# Patient Record
Sex: FEMALE | Race: WHITE | NOT HISPANIC OR LATINO | URBAN - METROPOLITAN AREA
[De-identification: names, ages, dates, MRNs, and addresses within clinical notes are randomized per-mention and may not be internally consistent; named-entity substitution may affect disease eponyms.]

---

## 2017-09-23 ENCOUNTER — EMERGENCY (EMERGENCY)
Facility: HOSPITAL | Age: 77
LOS: 1 days | End: 2017-09-23
Payer: MEDICARE

## 2017-09-23 PROCEDURE — 12001 RPR S/N/AX/GEN/TRNK 2.5CM/<: CPT

## 2017-09-23 PROCEDURE — 99284 EMERGENCY DEPT VISIT MOD MDM: CPT | Mod: 25

## 2018-08-27 ENCOUNTER — EMERGENCY (EMERGENCY)
Facility: HOSPITAL | Age: 78
LOS: 1 days | End: 2018-08-27
Payer: MEDICARE

## 2018-08-27 PROCEDURE — 99284 EMERGENCY DEPT VISIT MOD MDM: CPT

## 2018-08-27 PROCEDURE — 74177 CT ABD & PELVIS W/CONTRAST: CPT | Mod: 26

## 2018-10-03 PROBLEM — Z00.00 ENCOUNTER FOR PREVENTIVE HEALTH EXAMINATION: Status: ACTIVE | Noted: 2018-10-03

## 2018-10-10 ENCOUNTER — APPOINTMENT (OUTPATIENT)
Dept: VASCULAR SURGERY | Facility: CLINIC | Age: 78
End: 2018-10-10
Payer: MEDICARE

## 2018-10-10 VITALS
BODY MASS INDEX: 23.9 KG/M2 | DIASTOLIC BLOOD PRESSURE: 56 MMHG | OXYGEN SATURATION: 98 % | SYSTOLIC BLOOD PRESSURE: 122 MMHG | HEIGHT: 64 IN | HEART RATE: 56 BPM | WEIGHT: 140 LBS

## 2018-10-10 DIAGNOSIS — Z86.79 PERSONAL HISTORY OF OTHER DISEASES OF THE CIRCULATORY SYSTEM: ICD-10-CM

## 2018-10-10 DIAGNOSIS — Z78.9 OTHER SPECIFIED HEALTH STATUS: ICD-10-CM

## 2018-10-10 DIAGNOSIS — I51.9 HEART DISEASE, UNSPECIFIED: ICD-10-CM

## 2018-10-10 DIAGNOSIS — Z85.9 PERSONAL HISTORY OF MALIGNANT NEOPLASM, UNSPECIFIED: ICD-10-CM

## 2018-10-10 PROCEDURE — 99204 OFFICE O/P NEW MOD 45 MIN: CPT

## 2018-10-10 PROCEDURE — 93880 EXTRACRANIAL BILAT STUDY: CPT

## 2018-10-10 RX ORDER — METOPROLOL SUCCINATE 100 MG/1
100 TABLET, EXTENDED RELEASE ORAL
Refills: 0 | Status: ACTIVE | COMMUNITY

## 2018-10-10 RX ORDER — AMLODIPINE BESYLATE 5 MG/1
5 TABLET ORAL
Refills: 0 | Status: ACTIVE | COMMUNITY

## 2018-10-10 RX ORDER — ROSUVASTATIN CALCIUM 20 MG/1
20 TABLET, FILM COATED ORAL
Refills: 0 | Status: ACTIVE | COMMUNITY

## 2018-10-10 RX ORDER — LISINOPRIL 20 MG/1
20 TABLET ORAL
Refills: 0 | Status: ACTIVE | COMMUNITY

## 2018-10-10 RX ORDER — ASPIRIN 81 MG
81 TABLET, DELAYED RELEASE (ENTERIC COATED) ORAL
Refills: 0 | Status: ACTIVE | COMMUNITY

## 2019-11-26 ENCOUNTER — APPOINTMENT (OUTPATIENT)
Dept: ULTRASOUND IMAGING | Facility: CLINIC | Age: 79
End: 2019-11-26
Payer: MEDICARE

## 2019-11-26 PROCEDURE — 76770 US EXAM ABDO BACK WALL COMP: CPT

## 2021-05-12 ENCOUNTER — APPOINTMENT (OUTPATIENT)
Dept: VASCULAR SURGERY | Facility: CLINIC | Age: 81
End: 2021-05-12
Payer: MEDICARE

## 2021-05-12 VITALS — SYSTOLIC BLOOD PRESSURE: 158 MMHG | DIASTOLIC BLOOD PRESSURE: 78 MMHG | HEART RATE: 69 BPM

## 2021-05-12 PROCEDURE — 99213 OFFICE O/P EST LOW 20 MIN: CPT

## 2021-05-12 PROCEDURE — 93880 EXTRACRANIAL BILAT STUDY: CPT

## 2021-05-17 NOTE — HISTORY OF PRESENT ILLNESS
[FreeTextEntry1] : 82 y/o F with PMH of HTN, CAD, cancer and known asymptomatic carotid stenosis presents for routine follow up on carotids. Patient reports feeling well overall. She has recently returned from her trip to [place]. She has been staying active with daily walking. She also stays active attending grandchildren's sports competition. Denies any  dizziness, lightheadedness, CVA, TIA, vision changes, or other neurologic symptoms. Patient notes during routine doctor's appointment her BP is usually in the 130's. She takes baby Aspirin and Crestor daily. \par \par \par Patient accompanied by her  today.

## 2021-05-17 NOTE — ASSESSMENT
[FreeTextEntry1] : 82 y/o F with asymptomatic b/l carotid stenosis. On exam, BP: 158/78 mmHg, HR: 69 bpm. No focal deficits. Strong radial pulses bilaterally.  Carotid US ordered today shows R ICA:>70 % stenosis w/PSV: 286/82, L ICA: >70% stenosis w/PSV: 280/51 with slight increase of the peak systolic velocities.\par \par Plan:\par -Patient recommended she continues to take daily baby Aspirin. \par -Will continue to monitor with repeat carotid US surveillance in 6 months.\par -Should she develop any neurological symptoms such as slurred speech, vision changes to contact the office right away.

## 2021-05-17 NOTE — PROCEDURE
[FreeTextEntry1] : B/L Carotid US ordered today, shows: R ICA:>70 % stenosis w/PSV: 286/82, L ICA: >70% stenosis w/PSV: 280/51

## 2021-05-17 NOTE — DATA REVIEWED
[FreeTextEntry1] : I reviewed carotid duplex studies completed from outside hospital on 3/24/2021 at [facility] [place] in [place] showing: R ICA: 70-99% stenosis w/PSV: 303/76 and L ICA: 50-69% stenosis w/PSV: 268/51

## 2021-05-17 NOTE — PHYSICAL EXAM
[Normal Breath Sounds] : Normal breath sounds [Respiratory Effort] : normal respiratory effort [Normal Heart Sounds] : normal heart sounds [Normal Rate and Rhythm] : normal rate and rhythm [2+] : left 2+ [1+] : left 1+ [No Rash or Lesion] : No rash or lesion [Alert] : alert [Oriented to Person] : oriented to person [Oriented to Place] : oriented to place [Oriented to Time] : oriented to time [Calm] : calm [JVD] : no jugular venous distention  [Ankle Swelling (On Exam)] : not present [Varicose Veins Of Lower Extremities] : not present [] : not present [Abdomen Tenderness] : ~T ~M No abdominal tenderness [de-identified] : well appearing, NAD [de-identified] : NCAT [de-identified] : Supple  [de-identified] : FROM

## 2021-05-17 NOTE — CONSULT LETTER
[Dear  ___] : Dear  [unfilled], [FreeTextEntry2] : Joao Guerra MD\par [address]\[place] [address]\par \par John Chris MD\par [address] [facility]\[place] [address] \par \par Bella Calderón MD\par [address] [facility], [address]\[place] [address] [FreeTextEntry1] : I saw Ms. Colette Hoffmann for carotid followup. She has known bilateral, moderate carotid stenosis and remains asymptomatic. In October 2018 the low velocities were 259/67 cm/sec on the right and 164/42 on the left.  I advised continued observation at that time along with aspirin and a statin which she is still on. \par \par On exam, she has no focal neurological deficits or carotid bruits. Blood pressure 158/78, heart rate 69 b/min.\par \par Carotid duplex completed today that the right side is relatively stable with velocities of 286/82 and the left has progressed to 280/51 cm/s.  Both stenoses are around 70% but neither is at or above 80%.  \par \par Colette Hoffmann' carotid stenoses are approaching 80% but are still not at that "severe" degree.  I would not recommend intervention until the diastolic velocities clearly place the stenoses at 80%, i.e. above 125 cm/sec.  I advised her to stay on aspirin and statin and to repeat the duplex scan in 6 months. \par \par My complete EMR office note is below for your records. \par  [FreeTextEntry3] : Sincerely, \par \par Dick Velazquez M.D. \par , Surgical Services [facility]\par , Department of Surgery [facility]\par Professor of Surgery, [employer] at [facility]

## 2021-11-17 ENCOUNTER — APPOINTMENT (OUTPATIENT)
Dept: VASCULAR SURGERY | Facility: CLINIC | Age: 81
End: 2021-11-17
Payer: MEDICARE

## 2021-11-17 VITALS
BODY MASS INDEX: 23.9 KG/M2 | HEIGHT: 64 IN | WEIGHT: 140 LBS | SYSTOLIC BLOOD PRESSURE: 130 MMHG | HEART RATE: 118 BPM | DIASTOLIC BLOOD PRESSURE: 90 MMHG

## 2021-11-17 DIAGNOSIS — I65.23 OCCLUSION AND STENOSIS OF BILATERAL CAROTID ARTERIES: ICD-10-CM

## 2021-11-17 DIAGNOSIS — Z87.891 PERSONAL HISTORY OF NICOTINE DEPENDENCE: ICD-10-CM

## 2021-11-17 PROCEDURE — 99213 OFFICE O/P EST LOW 20 MIN: CPT

## 2021-11-17 PROCEDURE — 93880 EXTRACRANIAL BILAT STUDY: CPT

## 2021-11-23 NOTE — CONSULT LETTER
[Dear  ___] : Dear  [unfilled], [FreeTextEntry2] : Joao Guerra MD\par [address] [facility]\[place] [address]\par \par Bella Calderón MD\par [address] [facility], [address]\[place] [address]  [FreeTextEntry1] : I saw Ms. Colette Hoffmann again for routine followup of asymptomatic bilateral, moderate carotid stenoses.  She remains on a daily baby aspirin and statin. \par \par On exam, no focal neurological deficits or carotid bruits. Blood pressure 130/90, heart rate 118 b/min.\par \par Carotid duplex shows both carotids remain stable with 70% stenosis. Systolic velocities on the right side are 289 cm/s but the diastolic velocities remain only 58 cm/s.  The left side velocities are slightly less than the right.  There has been no significant change on either side.\par \par Colette Hoffmann' carotid stenoses remained stable.  Neither side is above 80%.  She should remain on an antiplatelet and a statin and continue carotid monitoring.   \par \par My complete EMR office note is below for your records. [FreeTextEntry3] : Sincerely, \par \par Dick Velazquez M.D. \par , Surgical Services [facility]\par , Department of Surgery [facility]\par Professor of Surgery, [employer] at [facility]

## 2021-11-23 NOTE — ADDENDUM
[FreeTextEntry1] : This note was written by Leta Win on 11/17/2021 acting as scribe for Caroline Weaver M.D.\par \par  I, Dr. Dick Velazquez, personally performed the evaluation and management (E/M) services for this established patient who presents today with (a) chronic problem(s) of (an) existing condition(s).  That E/M includes conducting the examination, assessing all conditions, and establishing a plan of care. Today, my ACP, Stephy Julian NP, was here to observe my evaluation and management services for this condition(s) to be followed going forward.

## 2021-11-23 NOTE — HISTORY OF PRESENT ILLNESS
[FreeTextEntry1] : 82 y/o F with PMH of HTN, CAD, cancer and known asymptomatic carotid stenosis, who presents for routine follow up. Patient reports feeling well overall. She has been staying active with daily walking. She also stays active attending grandchildren's sports competitions. She was told she might have mitral valve prolapse and will be completing an echocardiogram when she gets to [place]. Denies any dizziness, lightheadedness, CVA, TIA, vision changes, or other neurologic symptoms. She takes baby Aspirin and Crestor daily. \par \par She is accompanied by her .

## 2021-11-23 NOTE — PHYSICAL EXAM
[Normal Breath Sounds] : Normal breath sounds [Respiratory Effort] : normal respiratory effort [Normal Heart Sounds] : normal heart sounds [Normal Rate and Rhythm] : normal rate and rhythm [2+] : left 2+ [1+] : left 1+ [No Rash or Lesion] : No rash or lesion [Alert] : alert [Oriented to Person] : oriented to person [Oriented to Place] : oriented to place [Oriented to Time] : oriented to time [Calm] : calm [JVD] : no jugular venous distention  [Ankle Swelling (On Exam)] : not present [Varicose Veins Of Lower Extremities] : not present [] : not present [Abdomen Tenderness] : ~T ~M No abdominal tenderness [de-identified] : well appearing, NAD [de-identified] : NCAT [de-identified] : Supple  [de-identified] : FROM

## 2021-11-23 NOTE — PROCEDURE
[FreeTextEntry1] : B/L Carotid US ordered today to eval stenosis, shows: R ICA:>70 % stenosis w/PSV: 289/58, L ICA: >70% stenosis w/PSV: 257/39 with slight increase of the peak systolic velocities o the right side. [place] diastolic velocities.

## 2021-11-23 NOTE — ASSESSMENT
[Arterial/Venous Disease] : arterial/venous disease [Medication Management] : medication management [FreeTextEntry1] : 80 y/o F with asymptomatic b/l carotid stenosis. On exam, BP: 130/90 mmHg, HR: 118 bpm. No focal deficits. Strong radial pulses bilaterally.  Carotid US ordered today shows R ICA:>70 % stenosis w/PSV: 289/58, L ICA: >70% stenosis w/PSV: 257/39 with slight increase of the peak systolic velocities. Stable diastolic velocities. \par \par Plan:\par -Patient recommended she continues to take daily baby Aspirin and statin. \par -Will continue to monitor with repeat carotid US surveillance in 6 months. She should update me with echocardiogram results.\par -Should she develop any neurological symptoms, she knows to contact the office right away. \par \par

## 2021-11-24 ENCOUNTER — APPOINTMENT (OUTPATIENT)
Dept: OPHTHALMOLOGY | Facility: CLINIC | Age: 81
End: 2021-11-24

## 2022-05-11 ENCOUNTER — APPOINTMENT (OUTPATIENT)
Dept: OPHTHALMOLOGY | Facility: CLINIC | Age: 82
End: 2022-05-11
Payer: MEDICARE

## 2022-05-11 ENCOUNTER — NON-APPOINTMENT (OUTPATIENT)
Age: 82
End: 2022-05-11

## 2022-05-11 PROCEDURE — 92014 COMPRE OPH EXAM EST PT 1/>: CPT

## 2022-05-11 PROCEDURE — 92134 CPTRZ OPH DX IMG PST SGM RTA: CPT

## 2022-05-18 ENCOUNTER — APPOINTMENT (OUTPATIENT)
Dept: VASCULAR SURGERY | Facility: CLINIC | Age: 82
End: 2022-05-18

## 2024-01-08 NOTE — PHYSICAL EXAM
[Ankle Swelling (On Exam)] : not present [Varicose Veins Of Lower Extremities] : not present [] : not present [Abdomen Tenderness] : ~T ~M No abdominal tenderness [de-identified] : well appearing, NAD [de-identified] : FROM

## 2024-01-08 NOTE — HISTORY OF PRESENT ILLNESS
[FreeTextEntry1] : 84 y/o F with PMH of HTN, CAD, cancer and known asymptomatic carotid stenosis. She was last seen Nov 2021 and both ICAs had >70% stenosis. Diastolic velocities were <50 cm/s.  Today, she reports   Denies any dizziness, lightheadedness, CVA, TIA, vision changes, or other neurologic symptoms. She takes baby Aspirin and Crestor daily.   She is accompanied by her .

## 2024-01-08 NOTE — ASSESSMENT
[FreeTextEntry1] : 84 y/o F with asymptomatic b/l carotid stenosis.  On exam, BP: ____mmHg, HR: _____bpm. No focal deficits. Strong radial pulses bilaterally. Carotid US shows   -Patient recommended she continues to take daily baby Aspirin and statin. - -

## 2024-01-08 NOTE — ADDENDUM
[FreeTextEntry1] : I, Dr. Dick Velazquez, personally performed the evaluation and management (E/M) services for this established patient who presents today with (a) new problem(s)/exacerbation of (an) existing condition(s).  That E/M includes conducting the examination, assessing all new/exacerbated conditions, and establishing a new plan of care.  Today, my ACP, Stephy Julian NP, was here to observe my evaluation and management services for this new problem/exacerbated condition to be followed going forward.

## 2024-01-10 ENCOUNTER — APPOINTMENT (OUTPATIENT)
Dept: VASCULAR SURGERY | Facility: CLINIC | Age: 84
End: 2024-01-10

## 2024-01-10 ENCOUNTER — NON-APPOINTMENT (OUTPATIENT)
Age: 84
End: 2024-01-10

## 2024-04-02 ENCOUNTER — APPOINTMENT (RX ONLY)
Dept: URBAN - METROPOLITAN AREA CLINIC 137 | Facility: CLINIC | Age: 84
Setting detail: DERMATOLOGY
End: 2024-04-02

## 2024-04-02 DIAGNOSIS — Z41.9 ENCOUNTER FOR PROCEDURE FOR PURPOSES OTHER THAN REMEDYING HEALTH STATE, UNSPECIFIED: ICD-10-CM

## 2024-04-02 PROCEDURE — ? MICRODERMABRASION

## 2024-04-02 ASSESSMENT — LOCATION SIMPLE DESCRIPTION DERM: LOCATION SIMPLE: LEFT CHEEK

## 2024-04-02 ASSESSMENT — LOCATION DETAILED DESCRIPTION DERM: LOCATION DETAILED: LEFT INFERIOR CENTRAL MALAR CHEEK

## 2024-04-02 ASSESSMENT — LOCATION ZONE DERM: LOCATION ZONE: FACE

## 2024-04-02 NOTE — PROCEDURE: MICRODERMABRASION
Comments: Micropeel special
Detail Level: Generalized
Vacuum Pressure: 10
Indication: pigmentation abnormalities
Number Of Passes: 2
Wand: Medium
Prep Text: The patient's skin was cleaned and prepped.
Treatment Number: 1
Consent: Written consent obtained, risks reviewed including but not limited to crusting, scabbing, blistering, scarring, darker or lighter pigmentary change, bruising, and/or incomplete response.
Price (Use Numbers Only, No Special Characters Or $): 135.00

## 2024-11-27 ENCOUNTER — APPOINTMENT (OUTPATIENT)
Dept: OPHTHALMOLOGY | Facility: CLINIC | Age: 84
End: 2024-11-27

## 2024-12-25 PROBLEM — F10.90 ALCOHOL USE: Status: ACTIVE | Noted: 2018-10-10

## 2025-06-07 ENCOUNTER — NON-APPOINTMENT (OUTPATIENT)
Age: 85
End: 2025-06-07

## 2025-06-09 ENCOUNTER — APPOINTMENT (OUTPATIENT)
Dept: OPHTHALMOLOGY | Facility: CLINIC | Age: 85
End: 2025-06-09
Payer: MEDICARE

## 2025-06-09 ENCOUNTER — NON-APPOINTMENT (OUTPATIENT)
Age: 85
End: 2025-06-09

## 2025-06-09 PROCEDURE — 92134 CPTRZ OPH DX IMG PST SGM RTA: CPT

## 2025-06-09 PROCEDURE — 99204 OFFICE O/P NEW MOD 45 MIN: CPT
